# Patient Record
Sex: MALE | Race: WHITE | URBAN - METROPOLITAN AREA
[De-identification: names, ages, dates, MRNs, and addresses within clinical notes are randomized per-mention and may not be internally consistent; named-entity substitution may affect disease eponyms.]

---

## 2021-04-12 ENCOUNTER — COMMUNICATION - HEALTHEAST (OUTPATIENT)
Dept: TELEHEALTH | Facility: CLINIC | Age: 61
End: 2021-04-12

## 2021-04-12 ENCOUNTER — COMMUNICATION - HEALTHEAST (OUTPATIENT)
Dept: FAMILY MEDICINE | Facility: CLINIC | Age: 61
End: 2021-04-12

## 2021-04-12 ENCOUNTER — OFFICE VISIT - HEALTHEAST (OUTPATIENT)
Dept: FAMILY MEDICINE | Facility: CLINIC | Age: 61
End: 2021-04-12

## 2021-04-12 DIAGNOSIS — R50.9 FEVER, UNSPECIFIED FEVER CAUSE: ICD-10-CM

## 2021-04-12 DIAGNOSIS — R35.1 BENIGN PROSTATIC HYPERPLASIA WITH NOCTURIA: ICD-10-CM

## 2021-04-12 DIAGNOSIS — R10.31 ABDOMINAL PAIN, RIGHT LOWER QUADRANT: ICD-10-CM

## 2021-04-12 DIAGNOSIS — N40.1 BENIGN PROSTATIC HYPERPLASIA WITH NOCTURIA: ICD-10-CM

## 2021-04-12 LAB
ALBUMIN UR-MCNC: ABNORMAL G/DL
APPEARANCE UR: CLEAR
BACTERIA #/AREA URNS HPF: ABNORMAL /[HPF]
BASOPHILS # BLD AUTO: 0.1 THOU/UL (ref 0–0.2)
BASOPHILS NFR BLD AUTO: 0 % (ref 0–2)
BILIRUB UR QL STRIP: NEGATIVE
COLOR UR AUTO: YELLOW
EOSINOPHIL # BLD AUTO: 0.2 THOU/UL (ref 0–0.4)
EOSINOPHIL NFR BLD AUTO: 1 % (ref 0–6)
ERYTHROCYTE [DISTWIDTH] IN BLOOD BY AUTOMATED COUNT: 12.7 % (ref 11–14.5)
ERYTHROCYTE [SEDIMENTATION RATE] IN BLOOD BY WESTERGREN METHOD: 60 MM/HR (ref 0–15)
GLUCOSE UR STRIP-MCNC: NEGATIVE MG/DL
HCT VFR BLD AUTO: 42.5 % (ref 40–54)
HGB BLD-MCNC: 14.9 G/DL (ref 14–18)
HGB UR QL STRIP: ABNORMAL
IMM GRANULOCYTES # BLD: 0 THOU/UL
IMM GRANULOCYTES NFR BLD: 0 %
KETONES UR STRIP-MCNC: NEGATIVE MG/DL
LEUKOCYTE ESTERASE UR QL STRIP: NEGATIVE
LYMPHOCYTES # BLD AUTO: 1.7 THOU/UL (ref 0.8–4.4)
LYMPHOCYTES NFR BLD AUTO: 13 % (ref 20–40)
MCH RBC QN AUTO: 32 PG (ref 27–34)
MCHC RBC AUTO-ENTMCNC: 35.1 G/DL (ref 32–36)
MCV RBC AUTO: 91 FL (ref 80–100)
MONOCYTES # BLD AUTO: 0.9 THOU/UL (ref 0–0.9)
MONOCYTES NFR BLD AUTO: 7 % (ref 2–10)
MUCOUS THREADS #/AREA URNS LPF: ABNORMAL LPF
NEUTROPHILS # BLD AUTO: 10.1 THOU/UL (ref 2–7.7)
NEUTROPHILS NFR BLD AUTO: 78 % (ref 50–70)
NITRATE UR QL: NEGATIVE
PH UR STRIP: 5.5 [PH] (ref 5–8)
PLATELET # BLD AUTO: 212 THOU/UL (ref 140–440)
PMV BLD AUTO: 9 FL (ref 7–10)
RBC # BLD AUTO: 4.66 MILL/UL (ref 4.4–6.2)
RBC #/AREA URNS AUTO: ABNORMAL HPF
SP GR UR STRIP: 1.02 (ref 1–1.03)
SQUAMOUS #/AREA URNS AUTO: ABNORMAL LPF
UROBILINOGEN UR STRIP-ACNC: ABNORMAL
WBC #/AREA URNS AUTO: ABNORMAL HPF
WBC: 13 THOU/UL (ref 4–11)

## 2021-04-13 ENCOUNTER — AMBULATORY - HEALTHEAST (OUTPATIENT)
Dept: SURGERY | Facility: CLINIC | Age: 61
End: 2021-04-13

## 2021-04-13 ENCOUNTER — HOSPITAL ENCOUNTER (OUTPATIENT)
Dept: CT IMAGING | Facility: CLINIC | Age: 61
Discharge: HOME | End: 2021-04-13
Attending: FAMILY MEDICINE

## 2021-04-13 ENCOUNTER — ANESTHESIA - HEALTHEAST (OUTPATIENT)
Dept: SURGERY | Facility: CLINIC | Age: 61
End: 2021-04-13

## 2021-04-13 ENCOUNTER — SURGERY - HEALTHEAST (OUTPATIENT)
Dept: SURGERY | Facility: CLINIC | Age: 61
End: 2021-04-13

## 2021-04-13 DIAGNOSIS — K35.30 ACUTE APPENDICITIS WITH LOCALIZED PERITONITIS, UNSPECIFIED WHETHER ABSCESS PRESENT, UNSPECIFIED WHETHER GANGRENE PRESENT, UNSPECIFIED WHETHER PERFORATION PRESENT: ICD-10-CM

## 2021-04-13 DIAGNOSIS — R10.31 ABDOMINAL PAIN, RIGHT LOWER QUADRANT: ICD-10-CM

## 2021-04-13 DIAGNOSIS — R50.9 FEVER, UNSPECIFIED FEVER CAUSE: ICD-10-CM

## 2021-04-13 LAB
ALBUMIN SERPL-MCNC: 4.2 G/DL (ref 3.5–5)
ALP SERPL-CCNC: 58 U/L (ref 45–120)
ALT SERPL W P-5'-P-CCNC: 18 U/L (ref 0–45)
ANION GAP SERPL CALCULATED.3IONS-SCNC: 12 MMOL/L (ref 5–18)
AST SERPL W P-5'-P-CCNC: 17 U/L (ref 0–40)
BILIRUB SERPL-MCNC: 0.8 MG/DL (ref 0–1)
BUN SERPL-MCNC: 16 MG/DL (ref 8–22)
CALCIUM SERPL-MCNC: 9 MG/DL (ref 8.5–10.5)
CHLORIDE BLD-SCNC: 100 MMOL/L (ref 98–107)
CO2 SERPL-SCNC: 27 MMOL/L (ref 22–31)
CREAT SERPL-MCNC: 0.83 MG/DL (ref 0.7–1.3)
GFR SERPL CREATININE-BSD FRML MDRD: >60 ML/MIN/1.73M2
GLUCOSE BLD-MCNC: 95 MG/DL (ref 70–125)
LIPASE SERPL-CCNC: 12 U/L (ref 0–52)
POTASSIUM BLD-SCNC: 4.4 MMOL/L (ref 3.5–5)
PROT SERPL-MCNC: 7.7 G/DL (ref 6–8)
SODIUM SERPL-SCNC: 139 MMOL/L (ref 136–145)

## 2021-04-13 ASSESSMENT — MIFFLIN-ST. JEOR
SCORE: 2056.85
SCORE: 2039.16

## 2021-04-14 ENCOUNTER — COMMUNICATION - HEALTHEAST (OUTPATIENT)
Dept: SCHEDULING | Facility: CLINIC | Age: 61
End: 2021-04-14

## 2021-04-14 ASSESSMENT — MIFFLIN-ST. JEOR: SCORE: 2053.68

## 2021-04-15 ASSESSMENT — MIFFLIN-ST. JEOR: SCORE: 2065.02

## 2021-04-16 ENCOUNTER — COMMUNICATION - HEALTHEAST (OUTPATIENT)
Dept: SURGERY | Facility: CLINIC | Age: 61
End: 2021-04-16

## 2021-04-16 ASSESSMENT — MIFFLIN-ST. JEOR: SCORE: 2077.26

## 2021-04-26 ENCOUNTER — COMMUNICATION - HEALTHEAST (OUTPATIENT)
Dept: SURGERY | Facility: CLINIC | Age: 61
End: 2021-04-26

## 2021-04-29 ENCOUNTER — OFFICE VISIT - HEALTHEAST (OUTPATIENT)
Dept: SURGERY | Facility: CLINIC | Age: 61
End: 2021-04-29

## 2021-04-29 DIAGNOSIS — K35.30 ACUTE APPENDICITIS WITH LOCALIZED PERITONITIS, UNSPECIFIED WHETHER ABSCESS PRESENT, UNSPECIFIED WHETHER GANGRENE PRESENT, UNSPECIFIED WHETHER PERFORATION PRESENT: ICD-10-CM

## 2021-06-05 VITALS — BODY MASS INDEX: 38.02 KG/M2 | WEIGHT: 265 LBS | TEMPERATURE: 100.5 F

## 2021-06-05 VITALS — WEIGHT: 273.4 LBS | HEIGHT: 71 IN | BODY MASS INDEX: 38.27 KG/M2

## 2021-06-16 NOTE — PROGRESS NOTES
ASSESSMENT/PLAN:       1. Abdominal pain, right lower quadrant    - HM1(CBC and Differential), white blood cell count 13,000 with 78% neutrophils    - Sedimentation Rate, elevated at 60    - Comprehensive Metabolic Panel, pending    - Lipase, pending    - Urinalysis-UC if Indicated, 3-5 red blood cells with moderate blood on the Chemstrip and also mild protein  No significant bacteria and negative white blood cells, no culture was done    - CT Abdomen Pelvis With Oral With IV Contrast; Future    2. Fever, unspecified fever cause    - HM1(CBC and Differential)  - Sedimentation Rate  - Comprehensive Metabolic Panel  - Lipase  - Urinalysis-UC if Indicated  - CT Abdomen Pelvis With Oral With IV Contrast; Future    3. Benign prostatic hyperplasia with nocturia    - tamsulosin (FLOMAX) 0.4 mg cap; Take 1 capsule (0.4 mg total) by mouth daily after supper.  Dispense: 90 capsule; Refill: 3    I talked to the patient this morning and his symptoms are about the same this morning as they were yesterday morning.  His symptoms tended to get worse in the afternoon yesterday although after taking ibuprofen and acetaminophen he felt better and was able to eat.  No significant appetite this morning  No fever this morning  Pain on palpation of the abdomen is still localized to the right lower abdomen.  Slept fairly well although noted if sleeping on the left side he had more pain.    We decided to proceed with a urgent CT scan of the abdomen and pelvis today  Insurance is [employer] and our clinic and myself were listed as acceptable providers to be seen with this program that is a shared program for contributing money to the program to be available for distribution of expenses for members.    Patient was called with test results that are available and will send a [employer] message with the remainder of his test results or call him.  Level of medical decision making moderate  Number and complexity of problems include 1 undiagnosed new  problem with uncertain prognosis  Amount and complexity of data reviewed includes 3 or more unique tests that were ordered, reviewed and interpreted  Risk of complications moderate requiring decision-making in regard to the need for urgent imaging to evaluate if surgery might be needed or higher level of care with IV antibiotics.    ADDENDA: 4/13/2021  CT abd/pelvis  IMPRESSION:   1.  Acute appendicitis. No definite perforation identified, though an appendix wall abnormality is noted and impending perforation is possible. No abscess.  Patient informed and is still waiting at [facility].  I have talked to Dr. Cruz who is going to set up the operating room.  Have talked to bed control to get him a bed for admission.  Patient's questions were answered.      Alec Persno MD      PROGRESS NOTE   4/13/2021    SUBJECTIVE:  Rogerio Land is a 61 y.o. male  who presents for   Chief Complaint   Patient presents with     Abdominal Pain     low abdominal pain since last wednesday, started getting fevers, got up to 101., no diarrhea, vommitting      Medication Refill     flomax, send to [facility] in [place]      OTHER     questions aboujt hank cologfabi     The patient is seen today primarily because an acute onset of abdominal pain with fever.  Patient is new to our health system and to our clinic.  Patient had been getting care in [facility] through the [facility].  The patient spent some winter months in [place] starting in January and started back home the end of March.  On the way home stopped in [place] to visit his sister and got home early April 2021.  On April 7 the patient started with some lower abdominal cramps and vomiting once with nausea.  Never had any diarrhea.  The patient's sister talked with the patient and she had some GI symptoms and he assumed that what he was experiencing was some type of bug that was shared while he was in [place].  There was about 5 days between the exposure and  the patient's for starting had to have symptoms.  On April 10 the patient noted a fever 100.3 with a maximum temperature 101 on April 11 in the evening.  The patient continues to have some lower abdominal pain which is mild to moderate with decreased appetite in general but no additional vomiting and normal-appearing bowel movements.    The patient has been on tamsulosin 0.4 mg and since starting that last year has had a significant improvement in his urine flow and less nocturia.  The patient now has 0-2 times up at night and previously was up to go to the bathroom 3-4 times per night.  The patient has not noticed any dysuria.  Requesting a refill on his tamsulosin    The patient has not had colon cancer screening and was ordered to do a Cologuard test which was delivered during the months he was in [place].  The kit is still at home and it has not been done.  He was wondering if it still is okay to do that test.    There is no problem list on file for this patient.      Current Outpatient Medications   Medication Sig Dispense Refill     tamsulosin (FLOMAX) 0.4 mg cap Take 1 capsule (0.4 mg total) by mouth daily after supper. 90 capsule 3     No current facility-administered medications for this visit.        Social History     Tobacco Use   Smoking Status Former Smoker   Smokeless Tobacco Never Used           OBJECTIVE:        Recent Results (from the past 240 hour(s))   Sedimentation Rate   Result Value Ref Range    Sed Rate 60 (H) 0 - 15 mm/hr   HM1 (CBC with Diff)   Result Value Ref Range    WBC 13.0 (H) 4.0 - 11.0 thou/uL    RBC 4.66 4.40 - 6.20 mill/uL    Hemoglobin 14.9 14.0 - 18.0 g/dL    Hematocrit 42.5 40.0 - 54.0 %    MCV 91 80 - 100 fL    MCH 32.0 27.0 - 34.0 pg    MCHC 35.1 32.0 - 36.0 g/dL    RDW 12.7 11.0 - 14.5 %    Platelets 212 140 - 440 thou/uL    MPV 9.0 7.0 - 10.0 fL    Neutrophils % 78 (H) 50 - 70 %    Lymphocytes % 13 (L) 20 - 40 %    Monocytes % 7 2 - 10 %    Eosinophils % 1 0 - 6 %     Basophils % 0 0 - 2 %    Immature Granulocyte % 0 <=0 %    Neutrophils Absolute 10.1 (H) 2.0 - 7.7 thou/uL    Lymphocytes Absolute 1.7 0.8 - 4.4 thou/uL    Monocytes Absolute 0.9 0.0 - 0.9 thou/uL    Eosinophils Absolute 0.2 0.0 - 0.4 thou/uL    Basophils Absolute 0.1 0.0 - 0.2 thou/uL    Immature Granulocyte Absolute 0.0 <=0.0 thou/uL   Urinalysis-UC if Indicated   Result Value Ref Range    Color, UA Yellow Colorless, Yellow, Straw, Light Yellow    Clarity, UA Clear Clear    Glucose, UA Negative Negative    Protein, UA Trace (!) Negative    Bilirubin, UA Negative Negative    Urobilinogen, UA 1.0 E.U./dL 0.2 E.U./dL, 1.0 E.U./dL    pH, UA 5.5 5.0 - 8.0    Blood, UA Moderate (!) Negative    Ketones, UA Negative Negative    Nitrite, UA Negative Negative    Leukocytes, UA Negative Negative    Specific Gravity, UA 1.025 1.005 - 1.030    RBC, UA 3-5 (!) None Seen, 0-2 hpf    WBC, UA 0-5 None Seen, 0-5 hpf    Bacteria, UA Few (!) None Seen    Squam Epithel, UA 0-5 None Seen, 0-5 lpf    Mucus, UA Few (!) None Seen lpf       Vitals:    04/12/21 1433   Temp: 100.5  F (38.1  C)   Weight: (!) 265 lb (120.2 kg)     Weight: (!) 265 lb (120.2 kg)          Physical Exam:  GENERAL APPEARANCE: Pleasant 61-year-old male, NAD, well hydrated, well nourished  SKIN:  Normal skin turgor, no lesions/rashes   HEENT: moist mucous membranes, no rhinorrhea,  NECK: Normal without adenopathy or masses  CV: RRR, no M/G/R   LUNGS: CTAB  ABDOMEN: No CVA tenderness, abdomen is flat soft tender in the right lower abdomen without guarding or rebound tenderness.  No masses or enlarged organs.  No tenderness in the right upper quadrant or the left lower quadrant.  Bowel sounds are active.  EXTREMITY: no edema and full ROM of all joints  NEURO: no focal findings

## 2021-06-16 NOTE — ANESTHESIA PREPROCEDURE EVALUATION
Anesthesia Evaluation      Patient summary reviewed   No history of anesthetic complications     Airway   Mallampati: II  Neck ROM: full   Pulmonary - negative ROS and normal exam                          Cardiovascular - negative ROS  Exercise tolerance: > or = 4 METS  Rhythm: regular  Rate: normal,         Neuro/Psych - negative ROS     Endo/Other - negative ROS      GI/Hepatic/Renal - negative ROS      Other findings: H/o pericarditis 10 years ago. No issues now      Dental                         Anesthesia Plan  Planned anesthetic: general endotracheal    ASA 2     Anesthetic plan and risks discussed with: patient  Anesthesia plan special considerations: video-assisted,   Post-op plan: routine recovery      ketamine

## 2021-06-16 NOTE — ANESTHESIA POSTPROCEDURE EVALUATION
Patient: Rogerio Land  Procedure(s):  LAPAROSCOPIC APPENDECTOMY  LYSIS, ADHESIONS, LAPAROSCOPIC  LAPAROSCOPIC CECECTOMY  Anesthesia type: general    Patient location: PACU  Last vitals:   Vitals Value Taken Time   /79 04/13/21 1850   Temp 36.8  C (98.3  F) 04/13/21 1831   Pulse 90 04/13/21 1855   Resp 22 04/13/21 1855   SpO2 100 % 04/13/21 1855   Vitals shown include unvalidated device data.  Post vital signs: stable  Level of consciousness: awake and responds to simple questions  Post-anesthesia pain: pain controlled  Post-anesthesia nausea and vomiting: no  Pulmonary: unassisted, return to baseline  Cardiovascular: stable and blood pressure at baseline  Hydration: adequate  Anesthetic events: no    QCDR Measures:  ASA# 11 - Erin-op Cardiac Arrest: ASA11B - Patient did NOT experience unanticipated cardiac arrest  ASA# 12 - Erin-op Mortality Rate: ASA12B - Patient did NOT die  ASA# 13 - PACU Re-Intubation Rate: ASA13B - Patient did NOT require a new airway mgmt  ASA# 10 - Composite Anes Safety: ASA10A - No serious adverse event    Additional Notes:  Awake and appropriate In PACU, POUR.  Recommended tylenol to assist with POUR post op and avoid narcotics where able.

## 2021-06-16 NOTE — ANESTHESIA CARE TRANSFER NOTE
Last vitals:   Vitals:    04/13/21 1831   BP: 175/87   Pulse: 81   Resp: 14   Temp: 36.8  C (98.3  F)   SpO2: 100%     Patient's level of consciousness is drowsy  Spontaneous respirations: yes  Maintains airway independently: yes  Dentition unchanged: yes  Oropharynx: oropharynx clear of all foreign objects    QCDR Measures:  ASA# 20 - Surgical Safety Checklist: WHO surgical safety checklist completed prior to induction    PQRS# 430 - Adult PONV Prevention: 4558F - Pt received => 2 anti-emetic agents (different classes) preop & intraop  ASA# 8 - Peds PONV Prevention: NA - Not pediatric patient, not GA or 2 or more risk factors NOT present  PQRS# 424 - Erin-op Temp Management: 4559F - At least one body temp DOCUMENTED => 35.5C or 95.9F within required timeframe  PQRS# 426 - PACU Transfer Protocol: - Transfer of care checklist used  ASA# 14 - Acute Post-op Pain: ASA14B - Patient did NOT experience pain >= 7 out of 10

## 2021-06-16 NOTE — TELEPHONE ENCOUNTER
Pt calling in asking if he can still come in for establish care appointment this afternoon. He's been having abd pain x 6 days with on and off low grade fevers. No elevated temp or fever today. Pt told to come in for appointment.

## 2021-06-17 NOTE — PROGRESS NOTES
"Rogerio Land is a 61 y.o. male who is being evaluated via a billable telephone visit.      The patient has been notified of following:     \"This telephone visit will be conducted via a call between you and your physician/provider. We have found that certain health care needs can be provided without the need for a physical exam.  This service lets us provide the care you need with a short phone conversation.  If a prescription is necessary we can send it directly to your pharmacy.  If lab work is needed we can place an order for that and you can then stop by our lab to have the test done at a later time.    Telephone visits are billed at different rates depending on your insurance coverage. During this emergency period, for some insurers they may be billed the same as an in-person visit.  Please reach out to your insurance provider with any questions.    If during the course of the call the physician/provider feels a telephone visit is not appropriate, you will not be charged for this service.\"    Patient has given verbal consent to a Telephone visit? Yes          HPI: Pt is s/p LAPAROSCOPIC APPENDECTOMY, LYSIS, ADHESIONS, LAPAROSCOPIC, LAPAROSCOPIC CECECTOMY with Dr. Cruz on 4/13/21.   he is doing well.  Pain is well controlled:  Yes. No difficulties with the surgical wound/wounds, no reports of erythema or drainage.  he is eating well and denies fever and chills. Bowel function has returned to normal.    Pathology results reviewed with him      Assessment/Plan: Doing well after surgery and should follow up as needed.    Juan Chan PA-C  [phone]  General Surgery      Juan Chan PA-C    "

## 2021-11-22 ENCOUNTER — TELEPHONE (OUTPATIENT)
Dept: FAMILY MEDICINE | Facility: CLINIC | Age: 61
End: 2021-11-22
Payer: COMMERCIAL

## 2021-11-22 NOTE — TELEPHONE ENCOUNTER
Reason for Call:  Other     Detailed comments:pt received Cologuard kit today wasn't sure where it came from should he complete kit or schedule a colonoscopy his last one was 10 years ago. He is asking about getting a shingles shot if needed please schedule along with flu shot and booster ( he had the Moderna second one was in may)    Phone Number Patient can be reached at: Cell number on file:  [phone]  No relevant phone numbers on file.       Best Time: anytime    Can we leave a detailed message on this number? YES    Call taken on 11/22/2021 at 4:05 PM by Bailey Dodge

## 2021-11-23 NOTE — TELEPHONE ENCOUNTER
11/17/21 Bhanu Mejias from [facility] ordered this for colon cancer screening. Patient notified.

## 2022-01-08 ENCOUNTER — HEALTH MAINTENANCE LETTER (OUTPATIENT)
Age: 62
End: 2022-01-08

## 2022-11-20 ENCOUNTER — HEALTH MAINTENANCE LETTER (OUTPATIENT)
Age: 62
End: 2022-11-20

## 2023-04-15 ENCOUNTER — HEALTH MAINTENANCE LETTER (OUTPATIENT)
Age: 63
End: 2023-04-15

## 2024-06-16 ENCOUNTER — HEALTH MAINTENANCE LETTER (OUTPATIENT)
Age: 64
End: 2024-06-16